# Patient Record
Sex: MALE
[De-identification: names, ages, dates, MRNs, and addresses within clinical notes are randomized per-mention and may not be internally consistent; named-entity substitution may affect disease eponyms.]

---

## 2018-02-21 ENCOUNTER — HOSPITAL ENCOUNTER (OUTPATIENT)
Dept: HOSPITAL 5 - CATHLABREC | Age: 73
Discharge: HOME | End: 2018-02-21
Attending: INTERNAL MEDICINE
Payer: MEDICARE

## 2018-02-21 VITALS — DIASTOLIC BLOOD PRESSURE: 51 MMHG | SYSTOLIC BLOOD PRESSURE: 120 MMHG

## 2018-02-21 DIAGNOSIS — Z88.1: ICD-10-CM

## 2018-02-21 DIAGNOSIS — Z99.89: ICD-10-CM

## 2018-02-21 DIAGNOSIS — Z88.8: ICD-10-CM

## 2018-02-21 DIAGNOSIS — Z79.4: ICD-10-CM

## 2018-02-21 DIAGNOSIS — Z79.82: ICD-10-CM

## 2018-02-21 DIAGNOSIS — E11.9: ICD-10-CM

## 2018-02-21 DIAGNOSIS — I50.32: ICD-10-CM

## 2018-02-21 DIAGNOSIS — G47.30: ICD-10-CM

## 2018-02-21 DIAGNOSIS — Z79.899: ICD-10-CM

## 2018-02-21 DIAGNOSIS — Z90.49: ICD-10-CM

## 2018-02-21 DIAGNOSIS — I25.10: Primary | ICD-10-CM

## 2018-02-21 DIAGNOSIS — I11.0: ICD-10-CM

## 2018-02-21 DIAGNOSIS — Z95.1: ICD-10-CM

## 2018-02-21 LAB
BASOPHILS # (AUTO): 0 K/MM3 (ref 0–0.1)
BASOPHILS NFR BLD AUTO: 0.6 % (ref 0–1.8)
BUN SERPL-MCNC: 12 MG/DL (ref 9–20)
BUN/CREAT SERPL: 13 %
CALCIUM SERPL-MCNC: 9.4 MG/DL (ref 8.4–10.2)
EOSINOPHIL # BLD AUTO: 0.1 K/MM3 (ref 0–0.4)
EOSINOPHIL NFR BLD AUTO: 1.1 % (ref 0–4.3)
HCT VFR BLD CALC: 39.3 % (ref 35.5–45.6)
HEMOLYSIS INDEX: 7
HGB BLD-MCNC: 12.9 GM/DL (ref 11.8–15.2)
INR PPP: 0.81 (ref 0.87–1.13)
LYMPHOCYTES # BLD AUTO: 1.3 K/MM3 (ref 1.2–5.4)
LYMPHOCYTES NFR BLD AUTO: 17.5 % (ref 13.4–35)
MCH RBC QN AUTO: 28 PG (ref 28–32)
MCHC RBC AUTO-ENTMCNC: 33 % (ref 32–34)
MCV RBC AUTO: 83 FL (ref 84–94)
MONOCYTES # (AUTO): 0.6 K/MM3 (ref 0–0.8)
MONOCYTES % (AUTO): 8.5 % (ref 0–7.3)
PLATELET # BLD: 153 K/MM3 (ref 140–440)
RBC # BLD AUTO: 4.71 M/MM3 (ref 3.65–5.03)

## 2018-02-21 PROCEDURE — 85730 THROMBOPLASTIN TIME PARTIAL: CPT

## 2018-02-21 PROCEDURE — 93005 ELECTROCARDIOGRAM TRACING: CPT

## 2018-02-21 PROCEDURE — 85610 PROTHROMBIN TIME: CPT

## 2018-02-21 PROCEDURE — 36415 COLL VENOUS BLD VENIPUNCTURE: CPT

## 2018-02-21 PROCEDURE — 85025 COMPLETE CBC W/AUTO DIFF WBC: CPT

## 2018-02-21 PROCEDURE — 99156 MOD SED OTH PHYS/QHP 5/>YRS: CPT

## 2018-02-21 PROCEDURE — 80048 BASIC METABOLIC PNL TOTAL CA: CPT

## 2018-02-21 PROCEDURE — 93458 L HRT ARTERY/VENTRICLE ANGIO: CPT

## 2018-02-21 PROCEDURE — 93010 ELECTROCARDIOGRAM REPORT: CPT

## 2018-02-21 NOTE — CARDIAC CATHERIZATION REPORT
CARDIAC CATHETERIZATION



REFERRING PHYSICIAN:  Isacc Bond MD 



INDICATION FOR PROCEDURE:  The patient is a very pleasant 72-year-old 

gentleman referred here due to shortness of breath, intermittent chest pain, and

abnormal stress test with LAD ischemia, referred for left heart catheterization.

 Risks, benefits, and potential alternatives explained prior to obtaining

informed consent.



PROCEDURE IN DETAIL:  The patient was brought to catheterization lab in a

postabsorptive state, prepped and draped in sterile fashion.  An 8 mL of 2%

lidocaine was used to anesthetize the right groin.  A standard 6-Lith[place]ian sheath

was used to cannulate the right common femoral artery via modified Seldinger

technique.  All exchanges performed to exchange a J-tip guidewire.  JL3.5

catheter used to engage the left main.  No dampening or ventricularization. 

Cineangiography performed in all projections.  JR4 catheter was used to cross

the aortic valve under fluoroscopic guidance.  Left ventriculography was

performed in 30 MESA and 30 Italian projections via hand injections, catheter

flushed.  Manual pullback performed with continuous pressure monitoring. 

Catheter used to engage the right coronary.  No dampening or ventricularization.

 Cineangiography performed in all projections. Next, catheter used to engage the

SVG to OM.  Next, catheter used to engage the left subclavian and advanced

carefully over wire.  LIMA angiography performed.  Next, carefully removed from

the left subclavian over wire and from the body over a wire, sheath removed. 

Manual pressure used to achieve hemostasis.  No complications.  Moderate

sedation was initiated at 9:02 a.m. to 9:30 a.m.



DATA:  Aortic pressure is 130/50, LV pressure is 130, LVEDP of 18 mmHg.  Left

ventriculography revealed normal systolic performance with estimated ejection

fraction 50-55%.  No evidence of aortic stenosis.



CORONARY ANATOMY:  This is a left dominant system.  Left main is small without

significant disease, bifurcates in left anterior descending and left circumflex.

 LAD is diffusely diseased, occluded in the mid segment.  Competitive flow from

LIMA is noted.  Circumflex is occluded in the mid segment.  Right coronary is

small and nondominant.  SVG to OM branch is widely patent with back flow

throughout the entire left PDA.  The LIMA to LAD is widely patent.  There is

diffuse small vessel disease in the distal LAD.



CONCLUSIONS:

1.  Severe native coronary artery disease with 100% mid LAD and 100% mid

circumflex stenoses.  This is a left dominant system, small nondominant right

coronary, patent LIMA to LAD with mild diffuse small vessel disease distally.

2.  Patent SVG to OM trunk with a mild small vessel disease distally.

3.  Preserved left ventricular systolic performance with near normal LVEDP.

4.  No evidence of aortic stenosis.



At this point, recommend aggressive medical management, would consider

initiation of Ranexa therapy.  Continue atenolol, Imdur.  Consider adding

Ranexa.  Stable cardiac status.  Standard groin care.  Follow up with Dr. KAUR Bond in the office.  Results of the procedure were explained at length to the

patient and family.  All questions and concerns were addressed.





DD: 02/21/2018 09:22

DT: 02/21/2018 09:49

JOB# [id number]  [id number]

TOSIN/LUBNA

## 2018-02-21 NOTE — SHORT STAY SUMMARY
Short Stay Documentation


Date of service: 02/21/18





- History


H&P: obtained from office





- Allergies and Medications


Current Medications: 


 Allergies





amoxicillin [Amoxicillin] Allergy (Verified 02/21/18 07:28)


 Rash


ampicillin Allergy (Verified 02/21/18 07:28)


 Rash


apalcillin [Apalcillin] Allergy (Verified 02/21/18 07:28)


 Rash


ranitidine HCl [From Zantac] Allergy (Verified 02/21/18 07:28)


 Rash





 Home Medications











 Medication  Instructions  Recorded  Confirmed  Last Taken  Type


 


Aspirin [Baby Aspirin] 81 mg PO QDAY 10/07/13 02/21/18 02/21/18 History


 


Furosemide [Furosemide ORAL LIQ] 80 mg PO QDAY 10/07/13 02/21/18 02/20/18 

History


 


Insulin Glargine,Hum.rec.anlog 90 unit SQ QHS 10/07/13 02/21/18 02/20/18 History





[Lantus Solostar]     


 


Insulin NPH, Human [NovoLIN N] 75 unit SQ BID 10/07/13 02/21/18 02/20/18 History


 


Isosorbide Mononitrate [Isosorbide 60 mg PO QDAY 10/07/13 02/21/18 02/21/18 

History





Mononitrate ER]     


 


Pantoprazole [Protonix] 20 mg PO QDAY 10/07/13 02/21/18 02/20/18 History


 


Gabapentin [Neurontin] 300 mg PO TID 02/21/18 02/21/18 02/20/18 History


 


HYDROcodone/APAP  [Norco 1 each PO Q6HR PRN 02/21/18 02/21/18 02/20/18 

History





10/325]     


 


Zolpidem [Ambien] 10 mg PO DAILY 02/21/18 02/21/18 02/20/18 History


 


amLODIPine [Norvasc] 5 mg PO DAILY 02/21/18 02/21/18 02/21/18 History








Active Medications





Sodium Chloride (Nacl 0.9% 500 Ml)  500 mls @ 50 mls/hr IV AS DIRECT VARUN


   Stop: 02/21/18 17:59











Short Stay Discharge Plan


Follow up with: 


JOSELYN FARNSWORTH PA [Primary Care Provider] - 7 Days

## 2021-08-28 ENCOUNTER — TELEPHONE ENCOUNTER (OUTPATIENT)
Dept: URBAN - METROPOLITAN AREA CLINIC 13 | Facility: CLINIC | Age: 76
End: 2021-08-28

## 2021-08-29 ENCOUNTER — TELEPHONE ENCOUNTER (OUTPATIENT)
Dept: URBAN - METROPOLITAN AREA CLINIC 13 | Facility: CLINIC | Age: 76
End: 2021-08-29